# Patient Record
Sex: MALE | URBAN - METROPOLITAN AREA
[De-identification: names, ages, dates, MRNs, and addresses within clinical notes are randomized per-mention and may not be internally consistent; named-entity substitution may affect disease eponyms.]

---

## 2017-02-02 ENCOUNTER — OFFICE VISIT (OUTPATIENT)
Dept: OPHTHALMOLOGY | Facility: CLINIC | Age: 37
End: 2017-02-02
Payer: MEDICAID

## 2017-02-02 DIAGNOSIS — B00.52 HERPES KERATITIS: Primary | ICD-10-CM

## 2017-02-02 PROCEDURE — 92002 INTRM OPH EXAM NEW PATIENT: CPT | Performed by: STUDENT IN AN ORGANIZED HEALTH CARE EDUCATION/TRAINING PROGRAM

## 2017-02-02 RX ORDER — ACYCLOVIR 800 MG/1
400 TABLET ORAL
Qty: 25 TABLET | Refills: 3 | Status: SHIPPED | OUTPATIENT
Start: 2017-02-02 | End: 2017-03-01

## 2017-02-02 RX ORDER — OFLOXACIN 3 MG/ML
1 SOLUTION/ DROPS OPHTHALMIC 4 TIMES DAILY
Qty: 1 BOTTLE | Refills: 1 | Status: SHIPPED | OUTPATIENT
Start: 2017-02-02

## 2017-02-02 RX ORDER — EFAVIRENZ, EMTRICITABINE, AND TENOFOVIR DISOPROXIL FUMARATE 600; 200; 300 MG/1; MG/1; MG/1
TABLET, FILM COATED ORAL
COMMUNITY
Start: 2017-01-13

## 2017-02-02 ASSESSMENT — TONOMETRY
OS_IOP_MMHG: 10
OD_IOP_MMHG: 13
IOP_METHOD: TONOPEN

## 2017-02-02 ASSESSMENT — VISUAL ACUITY
METHOD: SNELLEN - LINEAR
OD_CC: 20/25
OS_CC: 20/30
OD_PH_CC: 20/20
OS_PH_CC: 20/20

## 2017-02-02 ASSESSMENT — SLIT LAMP EXAM - LIDS
COMMENTS: NORMAL
COMMENTS: NORMAL

## 2017-02-02 NOTE — PROGRESS NOTES
Current Eye Medications:  gentamicin oint tid, Vigamox bid and cyclogyl bid.     Subjective:  Possible iritis and keratitis left eye.  Pt reported that about a 6 days ago saw his pcp with sore red  left eye. He was given vigamox to use every two hours that did not help much. Patients symptoms of light sensitivitiy, pressure discomfort, redness and dariusz fbs got worse, so he went to [facility] er where cyclogyl and gentamicin oint were  added to his drop regimen, apparently no steroid  Drop was prescribed.    Is a contact lens wearer but denies sleeping in them. He has been out of his contacts since the eyes have been feeling poorly.      Objective:  See Ophthalmology Exam.       Assessment:  Archie Cross is a 36 year old male who presents with:   Encounter Diagnosis   Name Primary?     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 No keratitic precipitates or cell.        Plan:  Stop the gentamicin ointment  Stop the cyclogyl   Finish Vigamox then start Ofloxacin: One drop left eye four times a day    Start Acyclovir: One pill 5 times a day for 10 days  My pager number given if the eye worsens over the weekend    Ute Shi MD  [phone]

## 2017-02-02 NOTE — MR AVS SNAPSHOT
"              After Visit Summary   2/2/2017    Archie Cross    MRN: [id number]           Patient Information     Date Of Birth          1980        Visit Information        Provider Department      2/2/2017 3:15 PM Ute Shi MD [facility]        Today's Diagnoses     Herpes keratitis    -  1       Care Instructions    Stop the gentamicin ointment  Stop the cyclogyl   Finish Vigamox then start Ofloxacin: One drop left eye four times a day    Start Acyclovir: One pill 5 times a day for 10 days    Ute Shi MD  [phone]            Follow-ups after your visit        Follow-up notes from your care team     Return in about 5 days (around 2/7/2017) for MD check.      Who to contact     If you have questions or need follow up information about today's clinic visit or your schedule please contact [facility] directly at [phone].  Normal or non-critical lab and imaging results will be communicated to you by MyChart, letter or phone within 4 business days after the clinic has received the results. If you do not hear from us within 7 days, please contact the clinic through MyChart or phone. If you have a critical or abnormal lab result, we will notify you by phone as soon as possible.  Submit refill requests through [employer] or call your pharmacy and they will forward the refill request to us. Please allow 3 business days for your refill to be completed.          Additional Information About Your Visit        [employer] Information     [employer] lets you send messages to your doctor, view your test results, renew your prescriptions, schedule appointments and more. To sign up, go to [email or web address] . Click on \"Log in\" on the left side of the screen, which will take you to the Welcome page. Then click on \"Sign up Now\" on the right side of the page.     You will be asked to enter the access code listed below, as well as some personal information. Please follow " the directions to create your username and password.     Your access code is: [email or web address]  Expires: 5/3/2017  3:55 PM     Your access code will  in 90 days. If you need help or a new code, please call your [facility] clinic or [phone].        Care EveryWhere ID     This is your Care EveryWhere ID. This could be used by other organizations to access your [facility] medical records  [id number]         Blood Pressure from Last 3 Encounters:   08 120/80    Weight from Last 3 Encounters:   08 80.241 kg (176 lb 14.4 oz)              Today, you had the following     No orders found for display       Primary Care Provider Office Phone # Fax #    Edmund Vipul Nassar [phone]       [address] [address]        Thank you!     Thank you for choosing [facility]  for your care. Our goal is always to provide you with excellent care. Hearing back from our patients is one way we can continue to improve our services. Please take a few minutes to complete the written survey that you may receive in the mail after your visit with us. Thank you!             Your Updated Medication List - Protect others around you: Learn how to safely use, store and throw away your medicines at www.disposemymeds.org.          This list is accurate as of: 17  3:55 PM.  Always use your most recent med list.                   Brand Name Dispense Instructions for use    ATRIPLA 600-200-300 MG per tablet   Generic drug:  efavirenz-emtrictabine-tenofovir          NO ACTIVE MEDICATIONS      .

## 2017-02-02 NOTE — Clinical Note
[facility]  [address]  [place] [address]  [phone]  Dept: [phone]      2/2/2017    Re: Archie Cross      TO WHOM IT MAY CONCERN:    Archie Cross  was seen on 2/2/17.  Please excuse him on 2/4/17 due to illness.    Cordially      Ute Shi MD  [facility]

## 2017-02-02 NOTE — PATIENT INSTRUCTIONS
Stop the gentamicin ointment  Stop the cyclogyl   Finish Vigamox then start Ofloxacin: One drop left eye four times a day    Start Acyclovir: One pill 5 times a day for 10 days    Ute Shi MD  [phone]

## 2017-02-08 ENCOUNTER — OFFICE VISIT (OUTPATIENT)
Dept: OPHTHALMOLOGY | Facility: CLINIC | Age: 37
End: 2017-02-08
Payer: MEDICAID

## 2017-02-08 ENCOUNTER — TELEPHONE (OUTPATIENT)
Dept: OPHTHALMOLOGY | Facility: CLINIC | Age: 37
End: 2017-02-08

## 2017-02-08 ENCOUNTER — OFFICE VISIT (OUTPATIENT)
Dept: OPHTHALMOLOGY | Facility: CLINIC | Age: 37
End: 2017-02-08
Attending: OPHTHALMOLOGY
Payer: MEDICAID

## 2017-02-08 DIAGNOSIS — A43.9 NOCARDIA INFECTION: ICD-10-CM

## 2017-02-08 DIAGNOSIS — H16.142 PUNCTATE KERATITIS, LEFT: Primary | ICD-10-CM

## 2017-02-08 DIAGNOSIS — H16.9 KERATITIS: ICD-10-CM

## 2017-02-08 LAB
GRAM STN SPEC: NORMAL
KOH PREP SPEC: NORMAL
MICRO REPORT STATUS: NORMAL
MICRO REPORT STATUS: NORMAL
SPECIMEN SOURCE: NORMAL
SPECIMEN SOURCE: NORMAL

## 2017-02-08 PROCEDURE — 87075 CULTR BACTERIA EXCEPT BLOOD: CPT | Mod: XU | Performed by: OPHTHALMOLOGY

## 2017-02-08 PROCEDURE — 87252 VIRUS INOCULATION TISSUE: CPT | Performed by: OPHTHALMOLOGY

## 2017-02-08 PROCEDURE — 87070 CULTURE OTHR SPECIMN AEROBIC: CPT | Performed by: OPHTHALMOLOGY

## 2017-02-08 PROCEDURE — 87081 CULTURE SCREEN ONLY: CPT | Performed by: OPHTHALMOLOGY

## 2017-02-08 PROCEDURE — 87102 FUNGUS ISOLATION CULTURE: CPT | Performed by: OPHTHALMOLOGY

## 2017-02-08 PROCEDURE — 87210 SMEAR WET MOUNT SALINE/INK: CPT | Performed by: OPHTHALMOLOGY

## 2017-02-08 PROCEDURE — 99213 OFFICE O/P EST LOW 20 MIN: CPT | Mod: ZF

## 2017-02-08 PROCEDURE — 87207 SMEAR SPECIAL STAIN: CPT | Performed by: OPHTHALMOLOGY

## 2017-02-08 PROCEDURE — 92012 INTRM OPH EXAM EST PATIENT: CPT | Performed by: STUDENT IN AN ORGANIZED HEALTH CARE EDUCATION/TRAINING PROGRAM

## 2017-02-08 PROCEDURE — 87015 SPECIMEN INFECT AGNT CONCNTJ: CPT | Performed by: OPHTHALMOLOGY

## 2017-02-08 PROCEDURE — 65430 CORNEAL SMEAR: CPT | Mod: ZF | Performed by: OPHTHALMOLOGY

## 2017-02-08 PROCEDURE — 87076 CULTURE ANAEROBE IDENT EACH: CPT | Performed by: OPHTHALMOLOGY

## 2017-02-08 PROCEDURE — 87205 SMEAR GRAM STAIN: CPT | Performed by: OPHTHALMOLOGY

## 2017-02-08 ASSESSMENT — SLIT LAMP EXAM - LIDS
COMMENTS: NORMAL

## 2017-02-08 ASSESSMENT — VISUAL ACUITY
OS_CC: 20/40
OS_CC+: -1
OS_PH_CC: 20/30-1
OD_CC: 20/20
OS_PH_CC: 20/25
METHOD: SNELLEN - LINEAR
METHOD: SNELLEN - LINEAR
OD_CC: 20/20
CORRECTION_TYPE: GLASSES
OS_CC: 20/40

## 2017-02-08 ASSESSMENT — EXTERNAL EXAM - LEFT EYE: OS_EXAM: NORMAL

## 2017-02-08 ASSESSMENT — CONF VISUAL FIELD
OD_NORMAL: 1
METHOD: COUNTING FINGERS
OS_NORMAL: 1

## 2017-02-08 ASSESSMENT — EXTERNAL EXAM - RIGHT EYE: OD_EXAM: NORMAL

## 2017-02-08 NOTE — MR AVS SNAPSHOT
After Visit Summary   2017    Archie Cross    MRN: [id number]           Patient Information     Date Of Birth          1980        Visit Information        Provider Department      2017 9:45 AM Gregorio Anderson MD [facility]        Today's Diagnoses     Punctate keratitis, left    -  1        Follow-ups after your visit        Your next 10 appointments already scheduled     Feb 10, 2017  2:15 PM   RETURN CORNEA with Gregorio Anderson MD   Eye Clinic ([facility])    [address]  [address]  [address]  [facility] [address]   [phone]              Who to contact     Please call your clinic at [phone] to:    Ask questions about your health    Make or cancel appointments    Discuss your medicines    Learn about your test results    Speak to your doctor   If you have compliments or concerns about an experience at your clinic, or if you wish to file a complaint, please contact [facility] Patient Relations at [phone] or email us at [email or web address]         Additional Information About Your Visit        MyChart Information     [employer] is an electronic gateway that provides easy, online access to your medical records. With [employer], you can request a clinic appointment, read your test results, renew a prescription or communicate with your care team.     To sign up for [employer] visit the website at [email or web address]   You will be asked to enter the access code listed below, as well as some personal information. Please follow the directions to create your username and password.     Your access code is: [email or web address]  Expires: 5/3/2017  3:55 PM     Your access code will  in 90 days. If you need help or a new code, please contact your [facility] or call [phone] for assistance.        Care EveryWhere ID     This is your Care EveryWhere ID. This could be used  by other organizations to access your [place] medical records  [id number]         Blood Pressure from Last 3 Encounters:   08/28/08 120/80    Weight from Last 3 Encounters:   08/28/08 80.241 kg (176 lb 14.4 oz)              We Performed the Following     Anaerobic bacterial culture     Eye Corneal Culture Aerobic Bacterial     Fungus Culture, non-blood     Gram stain     Aleena prep     Parasite Culture     Parasite stain     Viral culture        Primary Care Provider Office Phone # Fax #    Edmund Vipul Nassar [phone]       [facility] [address] [address]        Thank you!     Thank you for choosing EYE CLINIC  for your care. Our goal is always to provide you with excellent care. Hearing back from our patients is one way we can continue to improve our services. Please take a few minutes to complete the written survey that you may receive in the mail after your visit with us. Thank you!             Your Updated Medication List - Protect others around you: Learn how to safely use, store and throw away your medicines at www.disposemymeds.org.          This list is accurate as of: 2/8/17 12:21 PM.  Always use your most recent med list.                   Brand Name Dispense Instructions for use    acyclovir 800 MG tablet    ZOVIRAX    25 tablet    Take 0.5 tablets (400 mg) by mouth 5 times daily for 10 days       ATRIPLA 600-200-300 MG per tablet   Generic drug:  efavirenz-emtrictabine-tenofovir          NO ACTIVE MEDICATIONS      .       ofloxacin 0.3 % ophthalmic solution    OCUFLOX    1 Bottle    Place 1 drop Into the left eye 4 times daily

## 2017-02-08 NOTE — PROGRESS NOTES
"I have confirmed the patient's and reviewed Past Medical History, Past Surgical History, Social History, Family History, Problem List, Medication List and agree with Tech note.    CC: red, painful eye    HPI: Archie Cross is a 36 year old HIV positive male referred for further evaluation of red and painful left eye. Begin to have symptoms of pressure-like discomfort and redness ~10 days ago, presented to [place] clinic at [facility] and was started on Vigamox. Symptoms were not improving and he presented to a [facility] ED in [place], and was subsequently seen by ophthalmologist Dr Shi,who suspected possibility of herpetic keratitis and started him on Acyclovir 400mg five times daily. At a second follow up visit with her this morning his vision had continued to mildly decline and his symptoms were not improving, and Dr Shi was concerned for the possibility of acanthamoeba. She discontinued his Vigamox and switched him to Ofloxacin QID.    Today he reports continued pressure-like sensation and redness. Has not subjectively noticed any decline in vision. Overall, his symptoms have subjectively improved over the past few days. Tearing, redness, stabbing pain, 7/10 intermittently OS.    Per Archie his most recent labwork for HIV within the past month demonstrated an undetectable viral load and a \"normal range\" CD4 count.     Denies any history of previous ocular infections, previous cold sores or shingles.    Gtts:  Ofloxacin QID OS    Assessment/plan:   1.  Keratitis, left eye   -not improving since initiation of topical antibiotics and oral acyclovir   -HIV-positive status, on Atripla   -per patient \"normal\" CD4 count and undetectable viral load but do not have records    -ring-like appearance, potentially nocardial infection   -continue Ofloxacin QID   -continue Acyclovir 400mg 5x/day   -epi scraping/culture today, send for aerobe/anaerobe, viral, fungal, acanthamoeba    -start Amikacin 15mg/mL q1 hour while " awake   -frequent PFATs   -follow up Friday    Ja Odonnell MD  PGY3, Dept of Ophthalmology    ~~~~~~~~~~~~~~~~~~~~~~~~~~~~~~~~~~~~~~~~~~~~~~~~~~~~~~~~~~~~~~~~    I have personally examined the patient and agree with the assessment and plan as delineated by the resident / fellow.  I have confirmed the contents of the chief complaint, history of present illness, review of systems, and medical / surgical history sections and edited the note as needed.    Gregorio Anderson MD

## 2017-02-08 NOTE — NURSING NOTE
Chief Complaints and History of Present Illnesses   Patient presents with     Keratoconus Evaluation     Possible acanthamoeba left eye.     HPI    Affected eye(s):  Left   Symptoms:     (Comment: Vision is fine RE, down little LE for last 2-3 weeks.)   No floaters   No flashes   Redness   Tearing   No itching   No burning         Do you have eye pain now?:  Yes   Location:  OS   Pain Level:  Severe Pain (7)   Pain Duration:  3 weeks   Pain Frequency:  Intermittent, Daily   Pain Characteristics:  Stabbing      Comments:  Possible acanthamoeba left eye.    Joaquín ENRIQUEZ  9:30 AM02/08/2017

## 2017-02-08 NOTE — PROGRESS NOTES
Current Eye Medications:  Ofloxacin qid left eye  Acytclovir 5X/day     Subjective:  Patient feels that there is not much change in the left eye.  Still is photophobic, with some pain.    Per medication record, took Atripla in the past. When queried, patient denied any past medical history, however when asked directly, he admits being HIV positive.    Contact lens wearer, vaguely denies exposure to hot tubs, fresh water, etc.     Objective:  See Ophthalmology Exam.       Assessment:  Archie Cross is a 36 year old male who presents with:   Encounter Diagnoses   Name Primary?     Keratitis left eye Contact lens wearer, HIV positive. No improvement on Vigamox or ACV, now with more ring-like appearance on exam. Suspicious for acanthamoeba. Referral to the .       Plan:  Continue Ofloxacin four times a day left eye and  Acyclovir 5x/day (pending referral)  Referral to [facility] (cornea)    Ute Shi MD  [phone]

## 2017-02-08 NOTE — PATIENT INSTRUCTIONS
Continue Ofloxacin four times a day left eye and  Acyclovir 5x/day (pending referral)  Referral to [facility] (cornea)    Ute Shi MD  [phone]

## 2017-02-08 NOTE — LETTER
"2/8/2017       RE: Archie Cross  [address] [id number]     Dear Colleague,    Thank you for referring your patient, Archie Cross, to the EYE CLINIC at [facility]. Please see a copy of my visit note below.    I have confirmed the patient's and reviewed Past Medical History, Past Surgical History, Social History, Family History, Problem List, Medication List and agree with Tech note.    CC: red, painful eye    HPI: Archie Cross is a 36 year old HIV positive male referred for further evaluation of red and painful left eye. Begin to have symptoms of pressure-like discomfort and redness ~10 days ago, presented to [place] clinic at [facility] and was started on Vigamox. Symptoms were not improving and he presented to a [facility] ED in [place], and was subsequently seen by ophthalmologist Dr Shi,who suspected possibility of herpetic keratitis and started him on Acyclovir 400mg five times daily. At a second follow up visit with her this morning his vision had continued to mildly decline and his symptoms were not improving, and Dr Shi was concerned for the possibility of acanthamoeba. She discontinued his Vigamox and switched him to Ofloxacin QID.    Today he reports continued pressure-like sensation and redness. Has not subjectively noticed any decline in vision. Overall, his symptoms have subjectively improved over the past few days. Tearing, redness, stabbing pain, 7/10 intermittently OS.    Per Archie his most recent labwork for HIV within the past month demonstrated an undetectable viral load and a \"normal range\" CD4 count.     Denies any history of previous ocular infections, previous cold sores or shingles.    Gtts:  Ofloxacin QID OS    Assessment/plan:   1.  Keratitis, left eye   -not improving since initiation of topical antibiotics and oral acyclovir   -HIV-positive status, on Atripla   -per patient \"normal\" CD4 count and undetectable viral load but do " not have records    -ring-like appearance, potentially nocardial infection   -continue Ofloxacin QID   -continue Acyclovir 400mg 5x/day   -epi scraping/culture today, send for aerobe/anaerobe, viral, fungal, acanthamoeba    -start Amikacin 15mg/mL q1 hour while awake   -frequent PFATs   -follow up Friday    Ja Odonnell MD  PGY3, Dept of Ophthalmology    ~~~~~~~~~~~~~~~~~~~~~~~~~~~~~~~~~~~~~~~~~~~~~~~~~~~~~~~~~~~~~~~~      Again, thank you for allowing me to participate in the care of your patient.      Sincerely,    Gregorio Anderson MD

## 2017-02-08 NOTE — TELEPHONE ENCOUNTER
[facility] pharmacists to order Amakacin 15mg/1ML 1 gtt LE q1hr whiel awake.    Patient will FU this Friday with Dr Anderson @2:15.    .Lois Quiroz COA 12:23 PM February 8, 2017

## 2017-02-09 LAB
MICRO REPORT STATUS: NORMAL
PARASITE SPEC INSPECT: NORMAL
SPECIMEN SOURCE: NORMAL

## 2017-02-10 ENCOUNTER — OFFICE VISIT (OUTPATIENT)
Dept: OPHTHALMOLOGY | Facility: CLINIC | Age: 37
End: 2017-02-10
Attending: OPHTHALMOLOGY
Payer: MEDICAID

## 2017-02-10 DIAGNOSIS — H16.142 PUNCTATE KERATITIS, LEFT: Primary | ICD-10-CM

## 2017-02-10 DIAGNOSIS — A43.9 NOCARDIA INFECTION: ICD-10-CM

## 2017-02-10 DIAGNOSIS — B20 HIV DISEASE (H): ICD-10-CM

## 2017-02-10 PROCEDURE — 99213 OFFICE O/P EST LOW 20 MIN: CPT | Mod: ZF

## 2017-02-10 ASSESSMENT — VISUAL ACUITY
OS_CC: 20/150
OS_PH_CC: 20/60
METHOD: SNELLEN - LINEAR
OD_CC: 20/20

## 2017-02-10 ASSESSMENT — TONOMETRY
IOP_METHOD: ICARE
OD_IOP_MMHG: 13
OS_IOP_MMHG: 15

## 2017-02-10 NOTE — PROGRESS NOTES
"I have confirmed the patient's and reviewed Past Medical History, Past Surgical History, Social History, Family History, Problem List, Medication List and agree with Tech note.    CC: red, painful eye    HPI: Archie Cross is a 36 year old HIV positive male referred for further evaluation of red and painful left eye. Begin to have symptoms of pressure-like discomfort and redness ~10 days ago, presented [place] [facility] [place] [facility] and was started on Vigamox. Symptoms were not improving and he presented to a [place] ED in [place], and was subsequently seen by ophthalmologist Dr Shi,who suspected possibility of herpetic keratitis and started him on Acyclovir 400mg five times daily. At a second follow up visit with her this morning his vision had continued to mildly decline and his symptoms were not improving, and Dr Shi was concerned for the possibility of acanthamoeba. She discontinued his Vigamox and switched him to Ofloxacin QID.    Per Archie his most recent labwork for HIV within the past month demonstrated an undetectable viral load and a \"normal range\" CD4 count.   Denies any history of previous ocular infections, previous cold sores or shingles.    At last visit he has superficial scrapping with cultures. Today he reports continued pressure-like sensation and redness. Has not subjectively noticed any decline in vision. Overall, his symptoms have subjectively improved over the past few days since the scapping.    Gtts:  Ofloxacin QID OS    Assessment/plan:   1.  Keratitis, left eye   -not improving since initiation of topical antibiotics and oral acyclovir, recurrent keratitis after superficial scrapping on amikacin   -HIV-positive status, on Atripla   -per patient \"normal\" CD4 count and undetectable viral load but do not have records    -ring-like appearance, potentially nocardial infection   -continue Ofloxacin QID   -continue Acyclovir 400mg 5x/day   -epi scraping/culture today, send for " aerobe/anaerobe, viral, fungal, acanthamoeba    -Continue Amikacin 15mg/mL q1 hour while awake   -Continue frequent PFATs   -If no response by next f/u, consider increasing to Amikacin 40mg/ml    -could also consider atypical microsporidial keratitis in HIV positive patient - would start empiric voriconazole      ~~~~~~~~~~~~~~~~~~~~~~~~~~~~~~~~~~~~~~~~~~~~~~~~~~~~~~~~~~~~~~~~    Complete documentation of historical and exam elements from today's encounter can be found in the full encounter summary report (not reduplicated in this progress note). I personally obtained the chief complaint(s) and history of present illness.  I confirmed and edited as necessary the review of systems, past medical/surgical history, family history, social history, and examination findings as documented by others; and I examined the patient myself. I personally reviewed the relevant tests, images, and reports as documented above. I formulated and edited as necessary the assessment and plan and discussed the findings and management plan with the patient and family.    Gregorio Anderson MD

## 2017-02-10 NOTE — LETTER
2/10/2017       RE: Archie Cross  [address] [id number]     To Whom It May Concer,    Archie Cross [facility]. He has a severe infection of his left eye and is under aggressive treatment. Please excuse him for missing work on Thursday, February 9 - Monday, February 13. He will be re-evaluated on Monday and he may require additional time off if the infection does not appear to be clearing.     Thank you for your assistance in this matter.      Sincerely,      Gregorio Anderson MD

## 2017-02-10 NOTE — NURSING NOTE
Chief Complaints and History of Present Illnesses   Patient presents with     Eye Problem     pain left eye     HPI    Symptoms:           Do you have eye pain now?:  Yes   Location:  OS   Pain Level:  Moderate Pain (5)      Comments:  Archie is a 36 year old male presenting with a history of:    1. Keratitis left eye  - feels left eye has improved.   - less pain.     Ocular meds:  - Ofloxacin QID  - Acyclovir 400mg 5x/day  - Amikacin 15mg/mL q1 hour   - used artificial tears until box ran out.     Jay OCONNOR 1:23 PM February 10, 2017

## 2017-02-10 NOTE — MR AVS SNAPSHOT
After Visit Summary   2/10/2017    Archie Cross    MRN: [id number]           Patient Information     Date Of Birth          1980        Visit Information        Provider Department      2/10/2017 2:15 PM Gregorio Anderson MD Eye Clinic         Follow-ups after your visit        Your next 10 appointments already scheduled     2017  2:45 PM   RETURN CORNEA with Gregorio Anderson MD   Eye Clinic ([facility])    [address]  [address]  [address]  [facility] [address] [phone]              Who to contact     Please call your clinic at [phone] to:    Ask questions about your health    Make or cancel appointments    Discuss your medicines    Learn about your test results    Speak to your doctor   If you have compliments or concerns about an experience at your clinic, or if you wish to file a complaint, please contact [facility] Patient Relations at [phone] or email us at [email or web address]         Additional Information About Your Visit        MyChart Information     [employer] is an electronic gateway that provides easy, online access to your medical records. With [employer], you can request a clinic appointment, read your test results, renew a prescription or communicate with your care team.     To sign up for [employer] visit the website at [email or web address]   You will be asked to enter the access code listed below, as well as some personal information. Please follow the directions to create your username and password.     Your access code is: [email or web address]  Expires: 5/3/2017  3:55 PM     Your access code will  in 90 days. If you need help or a new code, please contact your [facility] or call [phone] for assistance.        Care EveryWhere ID     This is your Care EveryWhere ID. This could be used by other organizations to access your [facility]  records  [id number]         Blood Pressure from Last 3 Encounters:   08/28/08 120/80    Weight from Last 3 Encounters:   08/28/08 80.241 kg (176 lb 14.4 oz)              Today, you had the following     No orders found for display       Primary Care Provider Office Phone # Fax #    Edmund Vipul Nassar [phone]       [address] [address]        Thank you!     Thank you for choosing EYE CLINIC  for your care. Our goal is always to provide you with excellent care. Hearing back from our patients is one way we can continue to improve our services. Please take a few minutes to complete the written survey that you may receive in the mail after your visit with us. Thank you!             Your Updated Medication List - Protect others around you: Learn how to safely use, store and throw away your medicines at www.disposemymeds.org.          This list is accurate as of: 2/10/17  3:19 PM.  Always use your most recent med list.                   Brand Name Dispense Instructions for use    acyclovir 800 MG tablet    ZOVIRAX    25 tablet    Take 0.5 tablets (400 mg) by mouth 5 times daily for 10 days       amikacin 500 mg/2 mL    AMIKIN     1 drop       ATRIPLA 600-200-300 MG per tablet   Generic drug:  efavirenz-emtrictabine-tenofovir          NO ACTIVE MEDICATIONS      .       ofloxacin 0.3 % ophthalmic solution    OCUFLOX    1 Bottle    Place 1 drop Into the left eye 4 times daily

## 2017-02-11 ASSESSMENT — SLIT LAMP EXAM - LIDS
COMMENTS: NORMAL
COMMENTS: NORMAL

## 2017-02-11 ASSESSMENT — EXTERNAL EXAM - RIGHT EYE: OD_EXAM: NORMAL

## 2017-02-11 ASSESSMENT — EXTERNAL EXAM - LEFT EYE: OS_EXAM: NORMAL

## 2017-02-13 ENCOUNTER — OFFICE VISIT (OUTPATIENT)
Dept: OPHTHALMOLOGY | Facility: CLINIC | Age: 37
End: 2017-02-13
Attending: OPHTHALMOLOGY
Payer: MEDICAID

## 2017-02-13 DIAGNOSIS — H16.142 PUNCTATE KERATITIS, LEFT: Primary | ICD-10-CM

## 2017-02-13 LAB
BACTERIA SPEC CULT: ABNORMAL
MICRO REPORT STATUS: ABNORMAL
SPECIMEN SOURCE: ABNORMAL

## 2017-02-13 PROCEDURE — 99213 OFFICE O/P EST LOW 20 MIN: CPT | Mod: ZF

## 2017-02-13 ASSESSMENT — VISUAL ACUITY
OS_CC: 20/70
OS_PH_CC: 20/30-2
CORRECTION_TYPE: GLASSES
OD_CC: 20/20
METHOD: SNELLEN - LINEAR

## 2017-02-13 ASSESSMENT — EXTERNAL EXAM - LEFT EYE: OS_EXAM: NORMAL

## 2017-02-13 ASSESSMENT — SLIT LAMP EXAM - LIDS
COMMENTS: NORMAL
COMMENTS: NORMAL

## 2017-02-13 ASSESSMENT — EXTERNAL EXAM - RIGHT EYE: OD_EXAM: NORMAL

## 2017-02-13 ASSESSMENT — TONOMETRY
OD_IOP_MMHG: 14
OS_IOP_MMHG: 10
IOP_METHOD: ICARE

## 2017-02-13 NOTE — MR AVS SNAPSHOT
After Visit Summary   2017    Archie Cross    MRN: [id number]           Patient Information     Date Of Birth          1980        Visit Information        Provider Department      2017 2:45 PM Gregorio Anderson MD [facility]        Today's Diagnoses     Punctate keratitis, left    -  1       Follow-ups after your visit        Your next 10 appointments already scheduled     2017  1:30 PM CST   RETURN CORNEA with Gregorio Anderson MD   Eye Clinic ([facility])    [address]  [address]  [address]  [facility] [address]   [phone]              Who to contact     Please call your clinic at [phone] to:    Ask questions about your health    Make or cancel appointments    Discuss your medicines    Learn about your test results    Speak to your doctor   If you have compliments or concerns about an experience at your clinic, or if you wish to file a complaint, please contact [facility] Patient Relations at [phone] or email us at [email or web address]         Additional Information About Your Visit        MyChart Information     Chirpme is an electronic gateway that provides easy, online access to your medical records. With Chirpme, you can request a clinic appointment, read your test results, renew a prescription or communicate with your care team.     To sign up for Chirpme visit the website at [email or web address]   You will be asked to enter the access code listed below, as well as some personal information. Please follow the directions to create your username and password.     Your access code is: [email or web address]  Expires: 5/3/2017  3:55 PM     Your access code will  in 90 days. If you need help or a new code, please contact your [facility] or call [phone] for assistance.        Care EveryWhere ID     This is your Care EveryWhere ID. This could be  used by other organizations to access your [place] medical records  [id number]         Blood Pressure from Last 3 Encounters:   08/28/08 120/80    Weight from Last 3 Encounters:   08/28/08 80.2 kg (176 lb 14.4 oz)              Today, you had the following     No orders found for display       Primary Care Provider Office Phone # Fax #    Edmund Vipul Nassar [phone]       [address] [address]        Thank you!     Thank you for choosing EYE CLINIC  for your care. Our goal is always to provide you with excellent care. Hearing back from our patients is one way we can continue to improve our services. Please take a few minutes to complete the written survey that you may receive in the mail after your visit with us. Thank you!             Your Updated Medication List - Protect others around you: Learn how to safely use, store and throw away your medicines at www.disposemymeds.org.          This list is accurate as of: 2/13/17  4:01 PM.  Always use your most recent med list.                   Brand Name Dispense Instructions for use    acyclovir 800 MG tablet    ZOVIRAX    25 tablet    Take 0.5 tablets (400 mg) by mouth 5 times daily for 10 days       amikacin 500 mg/2 mL    AMIKIN     1 drop       ATRIPLA 600-200-300 MG per tablet   Generic drug:  efavirenz-emtrictabine-tenofovir          NO ACTIVE MEDICATIONS      .       ofloxacin 0.3 % ophthalmic solution    OCUFLOX    1 Bottle    Place 1 drop Into the left eye 4 times daily       polyethylene glycol 0.4%- propylene glycol 0.3% 0.4-0.3 % Soln ophthalmic solution    SYSTANE ULTRA     Place 1 drop Into the left eye every hour as needed

## 2017-02-13 NOTE — PROGRESS NOTES
"I have confirmed the patient's and reviewed Past Medical History, Past Surgical History, Social History, Family History, Problem List, Medication List and agree with Tech note.    CC: red, painful eye    HPI: Archie Cross is a 36 year old HIV positive male referred for further evaluation of red and painful left eye. Begin to have symptoms of pressure-like discomfort and redness ~10 days ago, presented [place] [facility] [place] [facility] and was started on Vigamox. Symptoms were not improving and he presented to a [place] ED in [place], and was subsequently seen by ophthalmologist Dr Shi,who suspected possibility of herpetic keratitis and started him on Acyclovir 400mg five times daily. At a second follow up visit with her this morning his vision had continued to mildly decline and his symptoms were not improving, and Dr Shi was concerned for the possibility of acanthamoeba. She discontinued his Vigamox and switched him to Ofloxacin QID.    Per Archie his most recent labwork for HIV within the past month demonstrated an undetectable viral load and a \"normal range\" CD4 count.   Denies any history of previous ocular infections, previous cold sores or shingles.    At last visit he has superficial scrapping with cultures. Today he reports continued pressure-like sensation and redness. Has not subjectively noticed any decline in vision. Overall, his symptoms have subjectively improved over the past few days since the scraping. Redness and discomfort improved.    Gtts:  Amikacin 15mg/mL every hour    Assessment/plan:   1.  Keratitis, left eye   -not improving since initiation of topical antibiotics and oral acyclovir, recurrent keratitis after superficial scrapping on amikacin   -continue Acyclovir 400mg 5x/day   -Decrease Amikacin 15mg/mL to q2 hour while awake   -Continue frequent PFATs   -If no response by next f/u, consider increasing to Amikacin 40mg/ml    -could also consider atypical microsporidial keratitis in " HIV positive patient - would start empiric voriconazole    ~~~~~~~~~~~~~~~~~~~~~~~~~~~~~~~~~~~~~~~~~~~~~~~~~~~~~~~~~~~~~~~~    Complete documentation of historical and exam elements from today's encounter can be found in the full encounter summary report (not reduplicated in this progress note). I personally obtained the chief complaint(s) and history of present illness.  I confirmed and edited as necessary the review of systems, past medical/surgical history, family history, social history, and examination findings as documented by others; and I examined the patient myself. I personally reviewed the relevant tests, images, and reports as documented above. I formulated and edited as necessary the assessment and plan and discussed the findings and management plan with the patient and family.    Gregorio Anderson MD

## 2017-02-13 NOTE — NURSING NOTE
Chief Complaints and History of Present Illnesses   Patient presents with     Follow Up For     3 day f/u keratitis, left eye     HPI    Affected eye(s):  Left   Symptoms:     No decreased vision   No floaters   No flashes   Redness   Tearing   Photophobia      Duration:  3 days      Do you have eye pain now?:  Yes   Location:  OS   Pain Level:  Moderate Pain (5)      Comments:  3 day f/u keratitis, left eye  Pt states vision stable since last visit    Ocular meds:  Acyclovir 400mg 5x/day  PFATs Q1H, left eye    Elizabeth Garrison COA 2:50 PM February 13, 2017

## 2017-02-15 LAB
BACTERIA SPEC CULT: ABNORMAL
Lab: ABNORMAL
MICRO REPORT STATUS: ABNORMAL
SPECIMEN SOURCE: ABNORMAL

## 2017-02-17 ENCOUNTER — OFFICE VISIT (OUTPATIENT)
Dept: OPHTHALMOLOGY | Facility: CLINIC | Age: 37
End: 2017-02-17
Attending: OPHTHALMOLOGY
Payer: MEDICAID

## 2017-02-17 DIAGNOSIS — H16.142 PUNCTATE KERATITIS, LEFT: Primary | ICD-10-CM

## 2017-02-17 DIAGNOSIS — B20 HIV DISEASE (H): ICD-10-CM

## 2017-02-17 DIAGNOSIS — A43.9 NOCARDIA INFECTION: ICD-10-CM

## 2017-02-17 PROCEDURE — 99212 OFFICE O/P EST SF 10 MIN: CPT | Mod: ZF

## 2017-02-17 RX ORDER — PREDNISOLONE ACETATE 10 MG/ML
1 SUSPENSION/ DROPS OPHTHALMIC DAILY
Qty: 5 ML | Refills: 0 | Status: SHIPPED
Start: 2017-02-17 | End: 2017-02-22

## 2017-02-17 ASSESSMENT — TONOMETRY
OS_IOP_MMHG: 10
IOP_METHOD: ICARE
OD_IOP_MMHG: 15

## 2017-02-17 ASSESSMENT — VISUAL ACUITY
METHOD: SNELLEN - LINEAR
OS_CC: 20/70
OD_CC: 20/20
OS_PH_CC: 20/20-1

## 2017-02-17 ASSESSMENT — EXTERNAL EXAM - LEFT EYE: OS_EXAM: NORMAL

## 2017-02-17 ASSESSMENT — SLIT LAMP EXAM - LIDS
COMMENTS: NORMAL
COMMENTS: NORMAL

## 2017-02-17 ASSESSMENT — EXTERNAL EXAM - RIGHT EYE: OD_EXAM: NORMAL

## 2017-02-17 NOTE — MR AVS SNAPSHOT
After Visit Summary   2017    Archie Cross    MRN: [id number]           Patient Information     Date Of Birth          1980        Visit Information        Provider Department      2017 1:30 PM Gregorio Anderson MD [facility]        Today's Diagnoses     Punctate keratitis, left    -  1    Nocardia infection           Follow-ups after your visit        Your next 10 appointments already scheduled     2017  3:00 PM CST   RETURN CORNEA with Gregorio Anderson MD   Eye Clinic ([facility])    [address]  [address]  [address]  [facility] [address]   [phone]              Who to contact     Please call your clinic at [phone] to:    Ask questions about your health    Make or cancel appointments    Discuss your medicines    Learn about your test results    Speak to your doctor   If you have compliments or concerns about an experience at your clinic, or if you wish to file a complaint, please contact [facility] Patient Relations at [phone] or email us at [email or web address]         Additional Information About Your Visit        MyChart Information     [employer] is an electronic gateway that provides easy, online access to your medical records. With [employer], you can request a clinic appointment, read your test results, renew a prescription or communicate with your care team.     To sign up for [employer] visit the website at [email or web address]   You will be asked to enter the access code listed below, as well as some personal information. Please follow the directions to create your username and password.     Your access code is: [email or web address]  Expires: 5/3/2017  3:55 PM     Your access code will  in 90 days. If you need help or a new code, please contact your [facility] or call [phone] for assistance.        Care EveryWhere ID     This is your Care  EveryWhere ID. This could be used by other organizations to access your [place] medical records  [id number]         Blood Pressure from Last 3 Encounters:   08/28/08 120/80    Weight from Last 3 Encounters:   08/28/08 80.2 kg (176 lb 14.4 oz)              Today, you had the following     No orders found for display         Today's Medication Changes          These changes are accurate as of: 2/17/17  2:56 PM.  If you have any questions, ask your nurse or doctor.               Start taking these medicines.        Dose/Directions    prednisoLONE acetate 1 % ophthalmic susp   Commonly known as:  PRED FORTE   Used for:  Punctate keratitis, left, Nocardia infection   Started by:  Gregorio Anderson MD        Dose:  1 drop   Place 1 drop Into the left eye daily Shake well before using. Wait at least 2 minutes between eye drops if using concurrently with other eye drops.   Quantity:  5 mL   Refills:  0            Where to get your medicines      These medications were sent to [facility] [address] [place] [address] AT [address] [address]     Phone:  [phone]     prednisoLONE acetate 1 % ophthalmic susp                Primary Care Provider Office Phone # Fax #    Edmund Vipul Nassar [phone]       [address] [address]        Thank you!     Thank you for choosing EYE CLINIC  for your care. Our goal is always to provide you with excellent care. Hearing back from our patients is one way we can continue to improve our services. Please take a few minutes to complete the written survey that you may receive in the mail after your visit with us. Thank you!             Your Updated Medication List - Protect others around you: Learn how to safely use, store and throw away your medicines at www.disposemymeds.org.          This list is accurate as of: 2/17/17  2:56 PM.  Always use your most recent med list.                    Brand Name Dispense Instructions for use    acyclovir 800 MG tablet    ZOVIRAX    25 tablet    Take 0.5 tablets (400 mg) by mouth 5 times daily for 10 days       amikacin 500 mg/2 mL    AMIKIN     1 drop       ATRIPLA 600-200-300 MG per tablet   Generic drug:  efavirenz-emtrictabine-tenofovir          NO ACTIVE MEDICATIONS      .       ofloxacin 0.3 % ophthalmic solution    OCUFLOX    1 Bottle    Place 1 drop Into the left eye 4 times daily       polyethylene glycol 0.4%- propylene glycol 0.3% 0.4-0.3 % Soln ophthalmic solution    SYSTANE ULTRA     Place 1 drop Into the left eye every hour as needed       prednisoLONE acetate 1 % ophthalmic susp    PRED FORTE    5 mL    Place 1 drop Into the left eye daily Shake well before using. Wait at least 2 minutes between eye drops if using concurrently with other eye drops.

## 2017-02-17 NOTE — PROGRESS NOTES
"CC: red, painful eye    HPI: Archie Cross is a 36 year old HIV positive male referred for further evaluation of red and painful left eye. Begin to have symptoms of pressure-like discomfort and redness ~10 days ago, presented to [place] [facility] [place] [facility] and was started on Vigamox. Symptoms were not improving and he presented to a [place] ED in [place], and was subsequently seen by ophthalmologist Dr Shi,who suspected possibility of herpetic keratitis and started him on Acyclovir 400mg five times daily. At a second follow up visit with her this morning his vision had continued to mildly decline and his symptoms were not improving, and Dr Shi was concerned for the possibility of acanthamoeba. She discontinued his Vigamox and switched him to Ofloxacin QID.    Per Archie his most recent labwork for HIV within the past month demonstrated an undetectable viral load and a \"normal range\" CD4 count.   Denies any history of previous ocular infections, previous cold sores or shingles.    At last visit he has superficial scrapping with cultures. Today he reports continued pressure-like sensation and redness. Has not subjectively noticed any decline in vision. Overall, his symptoms have subjectively improved over the past few days since the scraping.     Redness improved.    Gtts:  Amikacin 15mg/mL every hour    Assessment/plan:     1.  Keratitis, left eye   -not improving since initiation of topical antibiotics and oral acyclovir, recurrent keratitis after superficial scrapping on amikacin   -HIV-positive status, on Atripla   -per patient \"normal\" CD4 count and undetectable viral load but do not have records    -ring-like appearance, potentially nocardial infection     -continue Acyclovir 400mg 5x/day   -epi scraping/culture today, send for aerobe/anaerobe, viral, fungal, acanthamoeba    -Decrease Amikacin 15mg/mL to q3 hour while awake   -Continue frequent PFATs   -If no response by next f/u, consider increasing to " Amikacin 40mg/ml    -could also consider atypical microsporidial keratitis in HIV positive patient - would start empiric voriconazole     F/u next week    ~~~~~~~~~~~~~~~~~~~~~~~~~~~~~~~~~~~~~~~~~~~~~~~~~~~~~~~~~~~~~~~~    Complete documentation of historical and exam elements from today's encounter can be found in the full encounter summary report (not reduplicated in this progress note). I personally obtained the chief complaint(s) and history of present illness.  I confirmed and edited as necessary the review of systems, past medical/surgical history, family history, social history, and examination findings as documented by others; and I examined the patient myself. I personally reviewed the relevant tests, images, and reports as documented above. I formulated and edited as necessary the assessment and plan and discussed the findings and management plan with the patient and family.    Gregorio Anderson MD

## 2017-02-17 NOTE — NURSING NOTE
Chief Complaints and History of Present Illnesses   Patient presents with     Follow Up For     Keratitis LE     HPI    Last Eye Exam:  2/13/17   Affected eye(s):  Left   Symptoms:     Decreased vision   Redness   Photophobia      Duration:  4 days   Frequency:  Constant       Do you have eye pain now?:  No      Comments:  Pt complains of blurry vision LE. Also, very photophobic.  Pt feels that eye is still very red. Denies any pain. Using NPAT's and still taking the Acyclovir. ANTHONY ATKINSON, COA 1:21 PM 02/17/2017

## 2017-02-20 LAB
BACTERIA SPEC CULT: NORMAL
MICRO REPORT STATUS: NORMAL
SPECIMEN SOURCE: NORMAL

## 2017-02-22 ENCOUNTER — OFFICE VISIT (OUTPATIENT)
Dept: OPHTHALMOLOGY | Facility: CLINIC | Age: 37
End: 2017-02-22
Attending: OPHTHALMOLOGY
Payer: MEDICAID

## 2017-02-22 DIAGNOSIS — B20 HIV DISEASE (H): Primary | ICD-10-CM

## 2017-02-22 DIAGNOSIS — H16.9 KERATITIS: ICD-10-CM

## 2017-02-22 DIAGNOSIS — A43.9 NOCARDIA INFECTION: ICD-10-CM

## 2017-02-22 DIAGNOSIS — H16.142 PUNCTATE KERATITIS, LEFT: ICD-10-CM

## 2017-02-22 PROCEDURE — 99212 OFFICE O/P EST SF 10 MIN: CPT | Mod: ZF

## 2017-02-22 RX ORDER — PREDNISOLONE ACETATE 10 MG/ML
1 SUSPENSION/ DROPS OPHTHALMIC DAILY
Qty: 5 ML | Refills: 0 | Status: SHIPPED | OUTPATIENT
Start: 2017-02-22

## 2017-02-22 ASSESSMENT — VISUAL ACUITY
OD_CC+: +1
CORRECTION_TYPE: GLASSES
METHOD: SNELLEN - LINEAR
OS_PH_CC: 20/25+2
OS_CC: 20/60
OD_CC: 20/20

## 2017-02-22 ASSESSMENT — EXTERNAL EXAM - LEFT EYE: OS_EXAM: NORMAL

## 2017-02-22 ASSESSMENT — TONOMETRY
OS_IOP_MMHG: 08
OD_IOP_MMHG: 15
IOP_METHOD: ICARE

## 2017-02-22 ASSESSMENT — SLIT LAMP EXAM - LIDS
COMMENTS: NORMAL
COMMENTS: NORMAL

## 2017-02-22 ASSESSMENT — REFRACTION_WEARINGRX
OD_SPHERE: -4.00
OS_SPHERE: -4.25
OS_AXIS: 014
SPECS_TYPE: SVL
OD_CYLINDER: +0.75
OS_CYLINDER: +0.75
OD_AXIS: 165

## 2017-02-22 ASSESSMENT — EXTERNAL EXAM - RIGHT EYE: OD_EXAM: NORMAL

## 2017-02-22 NOTE — NURSING NOTE
Chief Complaints and History of Present Illnesses   Patient presents with     Follow Up For     1 week follow up  Keratitis, left eye     HPI    Affected eye(s):  Left   Symptoms:     No floaters   No flashes   No redness   No Dryness         Do you have eye pain now?:  No      Comments:  Pt needs FMLA form filled out today.  Pt states vision is about the same as last visit.    Kina MORENO February 22, 2017 3:10 PM

## 2017-02-22 NOTE — PROGRESS NOTES
"CC: red, painful eye    HPI: Archie Cross is a 36 year old HIV positive male referred for further evaluation of red and painful left eye. Begin to have symptoms of pressure-like discomfort and redness ~10 days ago, presented to [place] clinic at [facility] and was started on Vigamox. Symptoms were not improving and he presented to a [place] ED in [place], and was subsequently seen by ophthalmologist Dr Shi,who suspected possibility of herpetic keratitis and started him on Acyclovir 400mg five times daily. At a second follow up visit with her this morning his vision had continued to mildly decline and his symptoms were not improving, and Dr Shi was concerned for the possibility of acanthamoeba. She discontinued his Vigamox and switched him to Ofloxacin QID.    Per Archie his most recent labwork for HIV within the past month demonstrated an undetectable viral load and a \"normal range\" CD4 count.   Denies any history of previous ocular infections, previous cold sores or shingles.    Unable to get prednisolone since last visit.    Gtts:  Amikacin 15mg/mL four times a day    Assessment/plan:     1.  Keratitis, left eye   -not improving since initiation of topical antibiotics and oral acyclovir, recurrent keratitis after superficial scrapping on amikacin   -HIV-positive status, on Atripla   -per patient \"normal\" CD4 count and undetectable viral load but do not have records    -ring-like appearance, potentially nocardial infection     -pt completed course of Acyclovir 400mg 5x/day (2 weeks)   -Decrease Amikacin 15mg/mL to twice a day left eye   -Add prednisolone twice a day left eye   -Continue frequent PFATs     -could also consider atypical microsporidial keratitis in HIV positive patient - would start empiric voriconazole if persistent keratitis     F/u next Monday    ~~~~~~~~~~~~~~~~~~~~~~~~~~~~~~~~~~~~~~~~~~~~~~~~~~~~~~~~~~~~~~~~    Complete documentation of historical and exam elements from today's encounter " can be found in the full encounter summary report (not reduplicated in this progress note). I personally obtained the chief complaint(s) and history of present illness.  I confirmed and edited as necessary the review of systems, past medical/surgical history, family history, social history, and examination findings as documented by others; and I examined the patient myself. I personally reviewed the relevant tests, images, and reports as documented above. I formulated and edited as necessary the assessment and plan and discussed the findings and management plan with the patient and family.    Gregorio Anderson MD

## 2017-02-22 NOTE — MR AVS SNAPSHOT
After Visit Summary   2017    Archie Cross    MRN: [id number]           Patient Information     Date Of Birth          1980        Visit Information        Provider Department      2017 3:00 PM Gregorio Anderson MD [facility]        Today's Diagnoses     Punctate keratitis, left        Nocardia infection           Follow-ups after your visit        Your next 10 appointments already scheduled     Mar 01, 2017  3:00 PM CST   RETURN CORNEA with Gregorio Anderson MD   Eye Clinic ([facility])    [address]  [address]  [address]  [facility] [address]   [phone]              Who to contact     Please call your clinic at [phone] to:    Ask questions about your health    Make or cancel appointments    Discuss your medicines    Learn about your test results    Speak to your doctor   If you have compliments or concerns about an experience at your clinic, or if you wish to file a complaint, please contact [facility] Patient Relations at [phone] or email us at [email or web address]         Additional Information About Your Visit        MyChart Information     Dis[employer] is an electronic gateway that provides easy, online access to your medical records. With [employer], you can request a clinic appointment, read your test results, renew a prescription or communicate with your care team.     To sign up for [employer] visit the website at [email or web address]   You will be asked to enter the access code listed below, as well as some personal information. Please follow the directions to create your username and password.     Your access code is: [email or web address]  Expires: 5/3/2017  3:55 PM     Your access code will  in 90 days. If you need help or a new code, please contact your [facility] or call [phone] for assistance.        Care EveryWhere ID     This is your Care  EveryWhere ID. This could be used by other organizations to access your [place] medical records  [id number]         Blood Pressure from Last 3 Encounters:   08/28/08 120/80    Weight from Last 3 Encounters:   08/28/08 80.2 kg (176 lb 14.4 oz)              Today, you had the following     No orders found for display         Where to get your medicines      Some of these will need a paper prescription and others can be bought over the counter.  Ask your nurse if you have questions.     Bring a paper prescription for each of these medications     prednisoLONE acetate 1 % ophthalmic susp          Primary Care Provider Office Phone # Fax #    Edmund Vipul Nassar [phone]       [address] [address]        Thank you!     Thank you for choosing EYE CLINIC  for your care. Our goal is always to provide you with excellent care. Hearing back from our patients is one way we can continue to improve our services. Please take a few minutes to complete the written survey that you may receive in the mail after your visit with us. Thank you!             Your Updated Medication List - Protect others around you: Learn how to safely use, store and throw away your medicines at www.disposemymeds.org.          This list is accurate as of: 2/22/17  4:04 PM.  Always use your most recent med list.                   Brand Name Dispense Instructions for use    acyclovir 800 MG tablet    ZOVIRAX    25 tablet    Take 0.5 tablets (400 mg) by mouth 5 times daily for 10 days       amikacin 500 mg/2 mL    AMIKIN     1 drop       ATRIPLA 600-200-300 MG per tablet   Generic drug:  efavirenz-emtrictabine-tenofovir          NO ACTIVE MEDICATIONS      .       ofloxacin 0.3 % ophthalmic solution    OCUFLOX    1 Bottle    Place 1 drop Into the left eye 4 times daily       polyethylene glycol 0.4%- propylene glycol 0.3% 0.4-0.3 % Soln ophthalmic solution    SYSTANE ULTRA     Place 1 drop Into the left  eye every hour as needed       prednisoLONE acetate 1 % ophthalmic susp    PRED FORTE    5 mL    Place 1 drop Into the left eye daily Shake well before using. Wait at least 2 minutes between eye drops if using concurrently with other eye drops.

## 2017-03-01 ENCOUNTER — OFFICE VISIT (OUTPATIENT)
Dept: OPHTHALMOLOGY | Facility: CLINIC | Age: 37
End: 2017-03-01
Attending: OPHTHALMOLOGY
Payer: COMMERCIAL

## 2017-03-01 DIAGNOSIS — B20 HIV DISEASE (H): ICD-10-CM

## 2017-03-01 DIAGNOSIS — H16.142 PUNCTATE KERATITIS, LEFT: Primary | ICD-10-CM

## 2017-03-01 DIAGNOSIS — A43.9 NOCARDIA INFECTION: ICD-10-CM

## 2017-03-01 LAB
SPECIMEN SOURCE: NORMAL
STATUS - QUEST: NORMAL
VIRUS SPEC CULT: NORMAL

## 2017-03-01 PROCEDURE — 92285 EXTERNAL OCULAR PHOTOGRAPHY: CPT | Mod: ZF | Performed by: OPHTHALMOLOGY

## 2017-03-01 PROCEDURE — 99212 OFFICE O/P EST SF 10 MIN: CPT | Mod: ZF

## 2017-03-01 ASSESSMENT — SLIT LAMP EXAM - LIDS
COMMENTS: NORMAL
COMMENTS: NORMAL

## 2017-03-01 ASSESSMENT — EXTERNAL EXAM - RIGHT EYE: OD_EXAM: NORMAL

## 2017-03-01 ASSESSMENT — VISUAL ACUITY
OS_CC: 20/40
OD_CC+: +1
CORRECTION_TYPE: GLASSES
OS_CC+: -1
METHOD: SNELLEN - LINEAR
OD_CC: 20/20
OS_PH_CC: 20/25+2

## 2017-03-01 ASSESSMENT — REFRACTION_WEARINGRX
OD_AXIS: 165
OD_SPHERE: -4.00
OS_CYLINDER: +0.75
OS_SPHERE: -4.25
SPECS_TYPE: SVL
OD_CYLINDER: +0.75
OS_AXIS: 014

## 2017-03-01 ASSESSMENT — TONOMETRY
OS_IOP_MMHG: 10
OD_IOP_MMHG: 13
IOP_METHOD: ICARE

## 2017-03-01 ASSESSMENT — EXTERNAL EXAM - LEFT EYE: OS_EXAM: NORMAL

## 2017-03-01 NOTE — NURSING NOTE
Chief Complaints and History of Present Illnesses   Patient presents with     Follow Up For     1 week follow up Keratitis, left eye     HPI    Affected eye(s):  Left   Symptoms:     No floaters   No flashes   No redness   No Dryness         Do you have eye pain now?:  No      Comments:  Pt states vision is improving in LE. No eye pain today.    Kina MORENO March 1, 2017 3:15 PM

## 2017-03-01 NOTE — MR AVS SNAPSHOT
After Visit Summary   3/1/2017    Archie Cross    MRN: [id number]           Patient Information     Date Of Birth          1980        Visit Information        Provider Department      3/1/2017 3:00 PM Gregorio Anderson MD [facility]        Today's Diagnoses     Punctate keratitis, left - Left Eye    -  1       Follow-ups after your visit        Your next 10 appointments already scheduled     Mar 15, 2017  1:15 PM CDT   RETURN CORNEA with Gregorio Anderson MD   Eye Clinic ([facility])    [address]  [address] [place]  [address]  [place] [address]   [phone]              Who to contact     Please call your clinic at [phone] to:    Ask questions about your health    Make or cancel appointments    Discuss your medicines    Learn about your test results    Speak to your doctor   If you have compliments or concerns about an experience at your clinic, or if you wish to file a complaint, please contact [facility] Patient Relations at [phone] or email us at [email or web address]         Additional Information About Your Visit        MyChart Information     [employer] is an electronic gateway that provides easy, online access to your medical records. With [employer], you can request a clinic appointment, read your test results, renew a prescription or communicate with your care team.     To sign up for [employer] visit the website at [email or web address]   You will be asked to enter the access code listed below, as well as some personal information. Please follow the directions to create your username and password.     Your access code is: [email or web address]  Expires: 5/3/2017  3:55 PM     Your access code will  in 90 days. If you need help or a new code, please contact your [facility] or call [phone] for assistance.        Care EveryWhere ID     This is your Care EveryWhere ID. This  could be used by other organizations to access your [facility] medical records  [id number]         Blood Pressure from Last 3 Encounters:   08/28/08 120/80    Weight from Last 3 Encounters:   08/28/08 80.2 kg (176 lb 14.4 oz)              Today, you had the following     No orders found for display       Primary Care Provider Office Phone # Fax #    Edmund Vipul Nassar [phone]       [address] [address]        Thank you!     Thank you for choosing [facility]  for your care. Our goal is always to provide you with excellent care. Hearing back from our patients is one way we can continue to improve our services. Please take a few minutes to complete the written survey that you may receive in the mail after your visit with us. Thank you!             Your Updated Medication List - Protect others around you: Learn how to safely use, store and throw away your medicines at www.disposemymeds.org.          This list is accurate as of: 3/1/17  3:43 PM.  Always use your most recent med list.                   Brand Name Dispense Instructions for use    acyclovir 800 MG tablet    ZOVIRAX    25 tablet    Take 0.5 tablets (400 mg) by mouth 5 times daily for 10 days       amikacin 500 mg/2 mL    AMIKIN     1 drop       ATRIPLA 600-200-300 MG per tablet   Generic drug:  efavirenz-emtrictabine-tenofovir          NO ACTIVE MEDICATIONS      .       ofloxacin 0.3 % ophthalmic solution    OCUFLOX    1 Bottle    Place 1 drop Into the left eye 4 times daily       polyethylene glycol 0.4%- propylene glycol 0.3% 0.4-0.3 % Soln ophthalmic solution    SYSTANE ULTRA     Place 1 drop Into the left eye every hour as needed       prednisoLONE acetate 1 % ophthalmic susp    PRED FORTE    5 mL    Place 1 drop Into the left eye daily Shake well before using. Wait at least 2 minutes between eye drops if using concurrently with other eye drops.

## 2017-03-01 NOTE — PROGRESS NOTES
"CC: red, painful eye    HPI: Arcihe Cross is a 36 year old HIV positive male referred for further evaluation of red and painful left eye. Begin to have symptoms of pressure-like discomfort and redness ~10 days ago, presented to [facility] at [facility] and was started on Vigamox. Symptoms were not improving and he presented to a [facility] ED in [place], and was subsequently seen by ophthalmologist Dr Shi,who suspected possibility of herpetic keratitis and started him on Acyclovir 400mg five times daily. At a second follow up visit with her this morning his vision had continued to mildly decline and his symptoms were not improving, and Dr Shi was concerned for the possibility of acanthamoeba. She discontinued his Vigamox and switched him to Ofloxacin QID.    Per Archie his most recent labwork for HIV within the past month demonstrated an undetectable viral load and a \"normal range\" CD4 count.   Denies any history of previous ocular infections, previous cold sores or shingles.    Vision and pain continue to improve in the left eye    Gtts:  Amikacin 15mg/mL twice a day left eye  prednisolone twice a day left eye    Assessment/plan:     1.  Keratitis, left eye   -not improving since initiation of topical antibiotics and oral acyclovir, recurrent keratitis after superficial scrapping on amikacin   -HIV-positive status, on Atripla   -per patient \"normal\" CD4 count and undetectable viral load but do not have records    -ring-like appearance, potentially nocardial infection     -pt completed course of Acyclovir 400mg 5x/day (2 weeks)   -continue Amikacin 15mg/mL twice a day left eye until next visit in 2 weeks   - prednisolone once a day x 1 week, then stop   -Continue frequent PFATs   -repeat slit lamp photos taken today to monitor progression    F/u 2 weeks    ~~~~~~~~~~~~~~~~~~~~~~~~~~~~~~~~~~~~~~~~~~~~~~~~~~~~~~~~~~~~~~~~    Complete documentation of historical and exam elements from today's encounter can be " found in the full encounter summary report (not reduplicated in this progress note). I personally obtained the chief complaint(s) and history of present illness.  I confirmed and edited as necessary the review of systems, past medical/surgical history, family history, social history, and examination findings as documented by others; and I examined the patient myself. I personally reviewed the relevant tests, images, and reports as documented above. I formulated and edited as necessary the assessment and plan and discussed the findings and management plan with the patient and family.    I personally viewed the slit lamp photos and I agree with the interpretation as documented by the resident/fellow and edited by me as appropriate.    Gregorio Anderson MD

## 2017-03-08 LAB
FUNGUS SPEC CULT: NORMAL
MICRO REPORT STATUS: NORMAL
SPECIMEN SOURCE: NORMAL

## 2022-01-13 DIAGNOSIS — Z00.6 EXAMINATION OF PARTICIPANT OR CONTROL IN CLINICAL RESEARCH: Primary | ICD-10-CM

## 2022-04-01 DIAGNOSIS — Z00.6 EXAMINATION OF PARTICIPANT OR CONTROL IN CLINICAL RESEARCH: Primary | ICD-10-CM

## 2023-05-24 DIAGNOSIS — Z00.6 EXAMINATION OF PARTICIPANT OR CONTROL IN CLINICAL RESEARCH: Primary | ICD-10-CM
